# Patient Record
(demographics unavailable — no encounter records)

---

## 2024-12-17 NOTE — PHYSICAL EXAM
[Chaperone Present] : A chaperone was present in the examining room during all aspects of the physical examination [50675] : A chaperone was present during the pelvic exam. [Appropriately responsive] : appropriately responsive [Alert] : alert [No Acute Distress] : no acute distress [No Lymphadenopathy] : no lymphadenopathy [Regular Rate Rhythm] : regular rate rhythm [No Murmurs] : no murmurs [Clear to Auscultation B/L] : clear to auscultation bilaterally [Soft] : soft [Non-tender] : non-tender [Non-distended] : non-distended [No HSM] : No HSM [No Lesions] : no lesions [No Mass] : no mass [Oriented x3] : oriented x3 [Examination Of The Breasts] : a normal appearance [No Masses] : no breast masses were palpable [Labia Majora] : normal [Labia Minora] : normal [Normal] : normal [Uterine Adnexae] : normal [No Tenderness] : no tenderness [Nl Sphincter Tone] : normal sphincter tone [FreeTextEntry2] : Kalani

## 2024-12-17 NOTE — PHYSICAL EXAM
[Chaperone Present] : A chaperone was present in the examining room during all aspects of the physical examination [[id number]] : A chaperone was present during the pelvic exam. [Appropriately responsive] : appropriately responsive [Alert] : alert [No Acute Distress] : no acute distress [No Lymphadenopathy] : no lymphadenopathy [Regular Rate Rhythm] : regular rate rhythm [No Murmurs] : no murmurs [Clear to Auscultation B/L] : clear to auscultation bilaterally [Soft] : soft [Non-tender] : non-tender [Non-distended] : non-distended [No HSM] : No HSM [No Lesions] : no lesions [No Mass] : no mass [Oriented x3] : oriented x3 [Examination Of The Breasts] : a normal appearance [No Masses] : no breast masses were palpable [Labia Majora] : normal [Labia Minora] : normal [Normal] : normal [Uterine Adnexae] : normal [No Tenderness] : no tenderness [Nl Sphincter Tone] : normal sphincter tone [FreeTextEntry2] : Kalani

## 2024-12-17 NOTE — HISTORY OF PRESENT ILLNESS
[TextBox_4] : Annual exam for this 43 year old female, , LMP 24 c/o irregular menses and skipped menses in .

## 2025-03-11 NOTE — HISTORY OF PRESENT ILLNESS
[de-identified] : Ms. Alma Padilla is a 43 y.o F who presents for an initial consultation regarding radial scar, referred by Dr. Marco Khan.  denies family history of breast or ovarian cancer PMH: Lupus - taking Plaquenil daily, Asthma- singular daily and rescue inhaler as needed Former everyday cigarette smoker in her mid 20s for less than 5 years No previous breast surgery or biopsies   B/L SM/US on 2/8/2025 shows focal architectural distortion in the central left breast middle depth. Follow up imaging recommended. B0 L DM/US on 2/14/2025 shows subtle distortion, in the left slightly medial, possibly inferior breast. Stereotactic biopsy recommended. B4A  L slightly inner USGB on 2/21/2025: radial scar

## 2025-03-11 NOTE — PHYSICAL EXAM
[Normal Neck Lymph Nodes] : normal neck lymph nodes  [Normal Supraclavicular Lymph Nodes] : normal supraclavicular lymph nodes [Normal Axillary Lymph Nodes] : normal axillary lymph nodes [Normal] : oriented to person, place and time, with appropriate affect [FreeTextEntry1] : SC present for exam  [de-identified] : Normal S1,S2. Regular rate and rhythm [de-identified] : Complete breast exam performed in supine and upright position. No palpable masses, tenderness, nipple discharge, inversion, deviation or enlarged axillary or supraclavicular lymph nodes bilaterally. [de-identified] : Clear breath sounds bilaterally with normal respiratory effort.

## 2025-03-11 NOTE — CONSULT LETTER
[Consult Letter:] : I had the pleasure of evaluating your patient, [unfilled]. [Please see my note below.] : Please see my note below. [Consult Closing:] : Thank you very much for allowing me to participate in the care of this patient.  If you have any questions, please do not hesitate to contact me. [Sincerely,] : Sincerely, [FreeTextEntry2] : Dr. Marco Khan. [FreeTextEntry3] : Rm Park M.D., FNIKI. Associate Professor of Surgery [employer] [facility] [employer] at [facility]

## 2025-03-11 NOTE — CONSULT LETTER
[Consult Letter:] : I had the pleasure of evaluating your patient, [unfilled]. [Please see my note below.] : Please see my note below. [Consult Closing:] : Thank you very much for allowing me to participate in the care of this patient.  If you have any questions, please do not hesitate to contact me. [Sincerely,] : Sincerely, [FreeTextEntry2] : Dr. Marco Khan. [FreeTextEntry3] : Rm Park M.D., FNIKI. Associate Professor of Surgery [employer] [facility] [employer] at [facility]

## 2025-03-11 NOTE — ASSESSMENT
[FreeTextEntry1] : IMP: Alma is a 43 y.o F with left breast radial scar  While radial scars are not cancerous, they can sometimes mimic cancer on mammograms because they can appear as a spiculated mass or architectural distortion. This is why further investigation with a biopsy is often needed to confirm the diagnosis. Although radial scars themselves aren't dangerous, some studies suggest that having multiple radial scars might slightly increase the risk of developing breast cancer later in life. Excision allows for a complete pathological examination of the tissue, ensuring that no cancerous cells are present.  PLAN: Left breast lumpectomy with magseed loc.  All medical entries were at my, Dr. Rm Park, direction. I have reviewed the chart and agree that the record accurately reflects my personal performance of the history, physical exam, assessment and plan. Our office Nurse Practitioner was present of the duration of the office visit.

## 2025-03-11 NOTE — PHYSICAL EXAM
[Normal Neck Lymph Nodes] : normal neck lymph nodes  [Normal Supraclavicular Lymph Nodes] : normal supraclavicular lymph nodes [Normal Axillary Lymph Nodes] : normal axillary lymph nodes [Normal] : oriented to person, place and time, with appropriate affect [FreeTextEntry1] : SC present for exam  [de-identified] : Normal S1,S2. Regular rate and rhythm [de-identified] : Complete breast exam performed in supine and upright position. No palpable masses, tenderness, nipple discharge, inversion, deviation or enlarged axillary or supraclavicular lymph nodes bilaterally. [de-identified] : Clear breath sounds bilaterally with normal respiratory effort.

## 2025-03-11 NOTE — HISTORY OF PRESENT ILLNESS
[de-identified] : Ms. Alma Padilla is a 43 y.o F who presents for an initial consultation regarding radial scar, referred by Dr. Marco Khan.  denies family history of breast or ovarian cancer PMH: Lupus - taking Plaquenil daily, Asthma- singular daily and rescue inhaler as needed Former everyday cigarette smoker in her mid 20s for less than 5 years No previous breast surgery or biopsies   B/L SM/US on 2/8/2025 shows focal architectural distortion in the central left breast middle depth. Follow up imaging recommended. B0 L DM/US on 2/14/2025 shows subtle distortion, in the left slightly medial, possibly inferior breast. Stereotactic biopsy recommended. B4A  L slightly inner USGB on 2/21/2025: radial scar

## 2025-04-16 NOTE — HISTORY OF PRESENT ILLNESS
[de-identified] : Ms. Alma Padilla is a 43 y.o F who presents regarding radial scar, referred by Dr. Marco Khan, here for a post op visit.   denies family history of breast or ovarian cancer PMH: Lupus - taking Plaquenil daily, Asthma- singular daily and rescue inhaler as needed Former everyday cigarette smoker in her mid 20s for less than 5 years No previous breast surgery or biopsies   B/L SM/US on 2/8/2025 shows focal architectural distortion in the central left breast middle depth. Follow up imaging recommended. B0 L DM/US on 2/14/2025 shows subtle distortion, in the left slightly medial, possibly inferior breast. Stereotactic biopsy recommended. B4A  L slightly inner USGB on 2/21/2025: radial scar  s/p Left lumpectomy 2 site 3/31/2025: Final path is minute intraductal papilloma and benign findings.

## 2025-04-16 NOTE — ASSESSMENT
[FreeTextEntry1] : IMP: Alma is a 43 y.o F with left breast radial scar  s/p Left lumpectomy 2 site 3/31/2025: Final path is minute intraductal papilloma and benign findings.  PLAN: Return as needed B/L MMG/US 2/2026  All medical entries were at my, Dr. Rm Park, direction. I have reviewed the chart and agree that the record accurately reflects my personal performance of the history, physical exam, assessment and plan. Our office Nurse Practitioner was present of the duration of the office visit.

## 2025-04-16 NOTE — PHYSICAL EXAM
[FreeTextEntry1] : SC present for exam  [de-identified] : Incision is healing well with no evidence of infection, seroma, or hematoma.

## 2025-04-16 NOTE — HISTORY OF PRESENT ILLNESS
[de-identified] : Ms. Alma Padilla is a 43 y.o F who presents regarding radial scar, referred by Dr. Marco Khan, here for a post op visit.   denies family history of breast or ovarian cancer PMH: Lupus - taking Plaquenil daily, Asthma- singular daily and rescue inhaler as needed Former everyday cigarette smoker in her mid 20s for less than 5 years No previous breast surgery or biopsies   B/L SM/US on 2/8/2025 shows focal architectural distortion in the central left breast middle depth. Follow up imaging recommended. B0 L DM/US on 2/14/2025 shows subtle distortion, in the left slightly medial, possibly inferior breast. Stereotactic biopsy recommended. B4A  L slightly inner USGB on 2/21/2025: radial scar  s/p Left lumpectomy 2 site 3/31/2025: Final path is minute intraductal papilloma and benign findings.

## 2025-04-16 NOTE — PHYSICAL EXAM
[FreeTextEntry1] : SC present for exam  [de-identified] : Incision is healing well with no evidence of infection, seroma, or hematoma.